# Patient Record
(demographics unavailable — no encounter records)

---

## 2025-02-18 NOTE — DISCUSSION/SUMMARY
[FreeTextEntry1] : 73 yo man with PMHx CAD and tobacco use disorder w/ COPD who presents as a new patient  Assessment: 1. CT lung w/ severe coronary calcification and dilated aortic root (4.2cm) 2. Dyspnea on exertion  Plan: 1. CT coronary with IV contrast and TTE 2. RTC ASA 81mg PO QD 3. RTC 2-3 weeks   During non face-to-face time, I reviewed relevant portions of the patients medical record. During face-to-face time, I took a relevant history and examined the patient. I also explained differential diagnoses, relevant cardiac diagnoses, workup, and management plan, which required a moderate level of medical decision making. I answered all questions related to the patient's medical conditions.   Machelle SARGENT (John)  of Cardiology [facility] at [facility]        [EKG obtained to assist in diagnosis and management of assessed problem(s)] : EKG obtained to assist in diagnosis and management of assessed problem(s)

## 2025-02-18 NOTE — HISTORY OF PRESENT ILLNESS
[FreeTextEntry1] : 71 yo man with FMHx of CAD with stent (sister) and PMHx CAD and tobacco use disorder w/ COPD who presents as a new patient  02/18/25 Dyspnea on exertion

## 2025-02-18 NOTE — HISTORY OF PRESENT ILLNESS
[FreeTextEntry1] : 73 yo man with FMHx of CAD with stent (sister) and PMHx CAD and tobacco use disorder w/ COPD who presents as a new patient  02/18/25 Dyspnea on exertion

## 2025-02-18 NOTE — DISCUSSION/SUMMARY
[FreeTextEntry1] : 73 yo man with PMHx CAD and tobacco use disorder w/ COPD who presents as a new patient  Assessment: 1. CT lung w/ severe coronary calcification and dilated aortic root (4.2cm) 2. Dyspnea on exertion  Plan: 1. CT coronary with IV contrast and TTE 2. RTC ASA 81mg PO QD 3. RTC 2-3 weeks   During non face-to-face time, I reviewed relevant portions of the patients medical record. During face-to-face time, I took a relevant history and examined the patient. I also explained differential diagnoses, relevant cardiac diagnoses, workup, and management plan, which required a moderate level of medical decision making. I answered all questions related to the patient's medical conditions.   Machelle SARGENT (John)  of Cardiology [facility] at [facility]        [EKG obtained to assist in diagnosis and management of assessed problem(s)] : EKG obtained to assist in diagnosis and management of assessed problem(s)

## 2025-03-04 NOTE — HISTORY OF PRESENT ILLNESS
[FreeTextEntry1] : 71 yo man with FMHx of CAD with stent (sister) and PMHx CAD and tobacco use disorder w/ COPD   03/04/25 Dyspnea on exertion 02/18/25 Dyspnea on exertion

## 2025-03-04 NOTE — DISCUSSION/SUMMARY
[FreeTextEntry1] : 71 yo man with FMHx of CAD with stent (sister) and PMHx CAD and tobacco use disorder w/ COPD   Assessment: 1. CT lung w/ severe coronary calcification and dilated aortic root (4.2cm) 2. Dyspnea on exertion  Plan: 1. CT coronary with IV contrast and TTE, CT pending, TTE done today, will review 2. ASA 81mg PO QD 3. RTC 1mo   During non face-to-face time, I reviewed relevant portions of the patients medical record. During face-to-face time, I took a relevant history and examined the patient. I also explained differential diagnoses, relevant cardiac diagnoses, workup, and management plan, which required a moderate level of medical decision making. I answered all questions related to the patient's medical conditions.   Machelle SARGENT (John)  of Cardiology [facility] at [facility]

## 2025-04-29 NOTE — HISTORY OF PRESENT ILLNESS
[FreeTextEntry1] : 73 yo man with FMHx of CAD with stent (sister) and PMHx CAD and tobacco use disorder w/ COPD   04/29/25 Dyspnea and chest discomfort with exertion 03/04/25 Dyspnea on exertion 02/18/25 Dyspnea on exertion

## 2025-04-29 NOTE — DISCUSSION/SUMMARY
[FreeTextEntry1] : 73 yo man with FMHx of CAD with stent (sister) and PMHx CAD and tobacco use disorder w/ COPD   Assessment: 1. CT lung w/ severe coronary calcification and dilated aortic root (4.2cm) -> CT coronary with IV contrast w/ FFR -> + 0.76 severe stenosis at distal LAD 2. Dyspnea on exertion  Plan: 1. Plan for LHC w/ Dr. Greene at [facility] 2. ASA 81mg PO QD 3. RTC right after LHC   During non face-to-face time, I reviewed relevant portions of the patients medical record. During face-to-face time, I took a relevant history and examined the patient. I also explained differential diagnoses, relevant cardiac diagnoses, workup, and management plan, which required a moderate level of medical decision making. I answered all questions related to the patient's medical conditions.   Machelle SARGENT (John)  of Cardiology [facility] at [facility]

## 2025-04-29 NOTE — DISCUSSION/SUMMARY
[FreeTextEntry1] : 71 yo man with FMHx of CAD with stent (sister) and PMHx CAD and tobacco use disorder w/ COPD   Assessment: 1. CT lung w/ severe coronary calcification and dilated aortic root (4.2cm) -> CT coronary with IV contrast w/ FFR -> + 0.76 severe stenosis at distal LAD 2. Dyspnea on exertion  Plan: 1. Plan for LHC w/ Dr. Greene at [facility] 2. ASA 81mg PO QD 3. RTC right after LHC   During non face-to-face time, I reviewed relevant portions of the patients medical record. During face-to-face time, I took a relevant history and examined the patient. I also explained differential diagnoses, relevant cardiac diagnoses, workup, and management plan, which required a moderate level of medical decision making. I answered all questions related to the patient's medical conditions.   Machelle SARGENT (John)  of Cardiology [facility] at [facility]

## 2025-05-20 NOTE — HISTORY OF PRESENT ILLNESS
[FreeTextEntry1] : 73 yo man with FMHx of CAD with stent (sister) and PMHx CAD s/p drug eluting stent and tobacco use disorder w/ COPD   05/20/25 Still dyspneic 04/29/25 Dyspnea and chest discomfort with exertion 03/04/25 Dyspnea on exertion 02/18/25 Dyspnea on exertion

## 2025-05-20 NOTE — DISCUSSION/SUMMARY
[FreeTextEntry1] : 73 yo man with FMHx of CAD with stent (sister) and PMHx CAD s/p drug eluting stent and tobacco use disorder w/ COPD   Assessment: 1. CT lung w/ severe coronary calcification and dilated aortic root (4.2cm) -> CT coronary with IV contrast w/ FFR -> + 0.76 severe stenosis at distal LAD -> s/p drug eluting stent 2. Dyspnea on exertion  Plan: 1. DAPT, statin, amlodipine 5mg PO QD, carvedilol 12.5mg PO BID. Go back to pulm. 2. RTC 6mo    During non face-to-face time, I reviewed relevant portions of the patients medical record. During face-to-face time, I took a relevant history and examined the patient. I also explained differential diagnoses, relevant cardiac diagnoses, workup, and management plan, which required a moderate level of medical decision making. I answered all questions related to the patient's medical conditions.   Machelle SARGENT (John)  of Cardiology [facility] at [facility]